# Patient Record
Sex: MALE | Race: BLACK OR AFRICAN AMERICAN | NOT HISPANIC OR LATINO | URBAN - METROPOLITAN AREA
[De-identification: names, ages, dates, MRNs, and addresses within clinical notes are randomized per-mention and may not be internally consistent; named-entity substitution may affect disease eponyms.]

---

## 2021-05-30 ENCOUNTER — EMERGENCY (EMERGENCY)
Facility: HOSPITAL | Age: 36
LOS: 1 days | Discharge: HOME | End: 2021-05-30
Attending: EMERGENCY MEDICINE
Payer: COMMERCIAL

## 2021-05-30 VITALS
DIASTOLIC BLOOD PRESSURE: 91 MMHG | RESPIRATION RATE: 20 BRPM | SYSTOLIC BLOOD PRESSURE: 151 MMHG | TEMPERATURE: 98 F | OXYGEN SATURATION: 98 % | HEART RATE: 76 BPM

## 2021-05-30 PROCEDURE — 99284 EMERGENCY DEPT VISIT MOD MDM: CPT

## 2021-05-30 PROCEDURE — 99285 EMERGENCY DEPT VISIT HI MDM: CPT

## 2021-05-30 NOTE — ED PROVIDER NOTE - CLINICAL SUMMARY MEDICAL DECISION MAKING FREE TEXT BOX
pt with etoh intoxication, oriented x3, follows commands, answers all questions, steady gait. no external signs of trauma and neuro intact.  clinically sober and bedside, to take pt home. pt states he doesn't want to stay in hospital and requesting d/c. advised pt/ on signs of head injury though no clinical suspicion at this time given denies hitting head, no signs of head trauma, gcs 15, no vomiting or neuro deficits. return precauitons. stable for d/c.
Skin normal color for race, warm, dry and intact. No evidence of rash.

## 2021-05-30 NOTE — ED PROVIDER NOTE - PATIENT PORTAL LINK FT
You can access the FollowMyHealth Patient Portal offered by [facility] by registering at the following website: [email or web address] By joining [employer]’s FollowMyHealth portal, you will also be able to view your health information using other applications (apps) compatible with our system.

## 2021-05-30 NOTE — ED ADULT NURSE NOTE - CHIEF COMPLAINT QUOTE
pt BIBEMS from [facility]. EMS called by bystander after they witnessed pt fall. pt admits to drinking today, offering no complaints, no signs of trauma noted. ambulates with steady gait.  at bedside. MD Faulkner at bedside.

## 2021-05-30 NOTE — ED PROVIDER NOTE - OBJECTIVE STATEMENT
34 y/o male no pmh biba from [facility] for intoxication. Pt here with . Per pt/, pt was drinking etoh, no other drugs. States pt may have tripped and fell a few times, denies hitting head. No loc. No vomiting. Pt denies any complaints and states he is fine and wolud really like to go home.  states pt was out with friends and drinking, denies any concern for other drugs/foul play.     ROS: No fever/chills. No eye pain/changes in vision, No ear pain/sore throat/dysphagia, No chest pain/palpitations. No SOB/cough/. No abdominal pain, N/V/D, no black/bloody bm. No dysuria/frequency/discharge, No headache. No Dizziness.    No rashes or breaks in skin. No numbness/tingling/weakness.

## 2021-05-30 NOTE — ED PROVIDER NOTE - PHYSICAL EXAMINATION
Gen: No acute distress, non toxic  HEENT: Mucous membranes moist, pink conjunctivae, EOMI. NCAT  Neck: no midline ttp  CV: RRR, nl s1/s2.  Resp: CTAB, normal rate and effort  GI: Abdomen soft, NT, ND. No rebound, no guarding  : No CVAT  Neuro: A&O x 3, moving all 4 extremities. ambulates with steady gait. cn 2-12 wnl.   MSK: No spine or joint tenderness to palpation  Skin: No rashes. intact and perfused.

## 2021-05-30 NOTE — ED ADULT TRIAGE NOTE - CHIEF COMPLAINT QUOTE
pt BIBEMS from [facility]. EMS called by bystander after they witnessed pt fall. pt admits to drinking today, offering no complaints, no signs of trauma noted. ambulates with steady gait.  at bedside. MD Faulkner at bedside.

## 2021-07-26 ENCOUNTER — TRANSCRIPTION ENCOUNTER (OUTPATIENT)
Age: 36
End: 2021-07-26

## 2022-06-26 ENCOUNTER — NON-APPOINTMENT (OUTPATIENT)
Age: 37
End: 2022-06-26

## 2023-01-18 ENCOUNTER — APPOINTMENT (OUTPATIENT)
Dept: FAMILY MEDICINE | Facility: CLINIC | Age: 38
End: 2023-01-18